# Patient Record
Sex: FEMALE | Race: WHITE | NOT HISPANIC OR LATINO | Employment: UNEMPLOYED | URBAN - METROPOLITAN AREA
[De-identification: names, ages, dates, MRNs, and addresses within clinical notes are randomized per-mention and may not be internally consistent; named-entity substitution may affect disease eponyms.]

---
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
## 2022-04-12 RX ORDER — SODIUM CHLORIDE 9 MG/ML
20 INJECTION, SOLUTION INTRAVENOUS ONCE
Status: COMPLETED | OUTPATIENT
Start: 2022-04-13 | End: 2022-04-13

## 2022-04-12 RX ORDER — ACETAMINOPHEN 325 MG/1
650 TABLET ORAL ONCE
Status: COMPLETED | OUTPATIENT
Start: 2022-04-13 | End: 2022-04-13

## 2022-04-12 RX ORDER — DIPHENHYDRAMINE HCL 25 MG
25 TABLET ORAL ONCE
Status: COMPLETED | OUTPATIENT
Start: 2022-04-13 | End: 2022-04-13

## 2022-04-13 ENCOUNTER — HOSPITAL ENCOUNTER (OUTPATIENT)
Dept: INFUSION CENTER | Facility: HOSPITAL | Age: 22
Discharge: HOME | End: 2022-04-13
Payer: COMMERCIAL

## 2022-04-13 VITALS
HEART RATE: 87 BPM | SYSTOLIC BLOOD PRESSURE: 121 MMHG | TEMPERATURE: 97.9 F | OXYGEN SATURATION: 99 % | DIASTOLIC BLOOD PRESSURE: 61 MMHG

## 2022-04-13 PROCEDURE — 96374 THER/PROPH/DIAG INJ IV PUSH: CPT

## 2022-04-13 RX ADMIN — SODIUM CHLORIDE 20 ML/HR: 0.9 INJECTION, SOLUTION INTRAVENOUS at 13:25

## 2022-04-13 RX ADMIN — USTEKINUMAB 390 MG: 130 SOLUTION INTRAVENOUS at 13:57

## 2022-04-13 RX ADMIN — DIPHENHYDRAMINE HCL 25 MG: 25 TABLET ORAL at 13:20

## 2022-04-13 RX ADMIN — ACETAMINOPHEN 650 MG: 325 TABLET ORAL at 13:19

## 2022-06-30 NOTE — PATIENT INSTRUCTIONS
Recommend patient go to the emergency room for further evaluation  Unable to do any imaging or labs here at urgent care to evaluate the pain  1. Functional oral stage for puree, regular solids and thin liquids marked by adequate oral acceptance, collection and transfer. 2. Functional pharyngeal phase for puree, regular solids and thin liquids marked by a present pharyngeal swallow trigger with hyolaryngeal elevation noted upon digital palpation without evidence of airway penetration/aspiration. Of note, patient coughed on initial trial of thin liquids, that did not reduplicate given further trials. Oropharyngeal swallow assessment unable to be completed, as patient unable to maintain alertness, and therefore PO trials contraindicated at this time.

## 2022-11-12 ENCOUNTER — OFFICE VISIT (OUTPATIENT)
Dept: FAMILY MEDICINE CLINIC | Facility: CLINIC | Age: 22
End: 2022-11-12

## 2022-11-12 VITALS
DIASTOLIC BLOOD PRESSURE: 80 MMHG | BODY MASS INDEX: 33.29 KG/M2 | OXYGEN SATURATION: 98 % | WEIGHT: 195 LBS | RESPIRATION RATE: 16 BRPM | HEART RATE: 82 BPM | TEMPERATURE: 98.3 F | SYSTOLIC BLOOD PRESSURE: 116 MMHG | HEIGHT: 64 IN

## 2022-11-12 DIAGNOSIS — K50.919 CROHN'S DISEASE WITH COMPLICATION, UNSPECIFIED GASTROINTESTINAL TRACT LOCATION (HCC): ICD-10-CM

## 2022-11-12 DIAGNOSIS — G43.109 MIGRAINE WITH AURA AND WITHOUT STATUS MIGRAINOSUS, NOT INTRACTABLE: ICD-10-CM

## 2022-11-12 DIAGNOSIS — Z13.220 ENCOUNTER FOR LIPID SCREENING FOR CARDIOVASCULAR DISEASE: ICD-10-CM

## 2022-11-12 DIAGNOSIS — Z13.6 ENCOUNTER FOR LIPID SCREENING FOR CARDIOVASCULAR DISEASE: ICD-10-CM

## 2022-11-12 DIAGNOSIS — H81.11 BENIGN PAROXYSMAL POSITIONAL VERTIGO OF RIGHT EAR: ICD-10-CM

## 2022-11-12 DIAGNOSIS — Z00.00 ANNUAL PHYSICAL EXAM: Primary | ICD-10-CM

## 2022-11-12 DIAGNOSIS — R53.83 FATIGUE, UNSPECIFIED TYPE: ICD-10-CM

## 2022-11-12 NOTE — PATIENT INSTRUCTIONS

## 2022-11-12 NOTE — ASSESSMENT & PLAN NOTE
- No focal deficits on neuro exam today   - Recommended blood work and focusing on staying hydrated   - Continued Excedrin PRN   - Offered daily preventative medication, she declines at this time

## 2022-11-12 NOTE — PROGRESS NOTES
Assessment/Plan:       Problem List Items Addressed This Visit        Cardiovascular and Mediastinum    Migraine     - No focal deficits on neuro exam today   - Recommended blood work and focusing on staying hydrated   - Continued Excedrin PRN   - Offered daily preventative medication, she declines at this time          Relevant Orders    CBC and differential    Comprehensive metabolic panel    TSH, 3rd generation with Free T4 reflex       Nervous and Auditory    Benign paroxysmal positional vertigo of right ear     - Positive Horacio-Hallpike right   - Recommended trial PT vestibular therapy          Relevant Orders    CBC and differential    Comprehensive metabolic panel    TSH, 3rd generation with Free T4 reflex    Ambulatory Referral to Physical Therapy       Other    Crohn's disease ([facility] [address] )    Fatigue     - Recommended sleep medicine consult/testing for sleep apnea          Relevant Orders    Ambulatory Referral to Sleep Medicine    TSH, 3rd generation with Free T4 reflex      Other Visit Diagnoses     Annual physical exam    -  Primary    Encounter for lipid screening for cardiovascular disease        Relevant Orders    Lipid Panel with Direct LDL reflex            Subjective:      Patient ID: Truong Chong is a 23 y o  female  HPI     Crohn's- She is planning on seeing new GI doctor, feels symptoms are uncontrolled, off medication right now  Has an appointment in December  Migraines- Typically occurring once weekly, takes Excedrin which helps  Maxalt was helping  Same type of headache, although now noticing a hot flash with it, sometimes dizziness  Feels like she's going to fall  Feels like she's spinning  Sometimes triggered by moving her head, movement  This lasts for a couple hours  Happens more so at work due to bright lights  When the headache is really bad, she feels like she is slowed, slurring although other people don't notice  Last migraine 1 week ago   History of heavy periods, but better with birth control  Also has noticed an eye twitch of her right eye for a couple months now  Fatigued as well, sleeping a lot and not getting restful sleep  Strong family history of sleep apnea  The following portions of the patient's history were reviewed and updated as appropriate: allergies, current medications, past family history, past medical history, past social history, past surgical history, and problem list     Review of Systems   All other systems reviewed and are negative  Objective:      /80   Pulse 82   Temp 98 3 °F (36 8 °C) (Tympanic)   Resp 16   Ht 5' 4" (1 626 m)   Wt 88 5 kg (195 lb)   SpO2 98%   BMI 33 47 kg/m²          Physical Exam  Vitals reviewed  Constitutional:       General: She is not in acute distress  Appearance: Normal appearance  She is not ill-appearing, toxic-appearing or diaphoretic  HENT:      Head: Normocephalic and atraumatic  Comments: Horacio-Hallpike positive right  Eyes:      General:         Right eye: No discharge  Left eye: No discharge  Extraocular Movements: Extraocular movements intact  Conjunctiva/sclera: Conjunctivae normal       Pupils: Pupils are equal, round, and reactive to light  Cardiovascular:      Rate and Rhythm: Normal rate and regular rhythm  Heart sounds: Normal heart sounds  No murmur heard  No friction rub  No gallop  Pulmonary:      Effort: Pulmonary effort is normal  No respiratory distress  Breath sounds: Normal breath sounds  No stridor  No wheezing or rhonchi  Musculoskeletal:         General: No swelling, tenderness or signs of injury  Right lower leg: No edema  Left lower leg: No edema  Skin:     General: Skin is warm  Coloration: Skin is not pale  Findings: No erythema or rash  Neurological:      General: No focal deficit present  Mental Status: She is alert and oriented to person, place, and time  Cranial Nerves: No cranial nerve deficit  Sensory: No sensory deficit  Motor: No weakness        Coordination: Coordination normal    Psychiatric:         Mood and Affect: Mood normal          Behavior: Behavior normal              Banner Leyden, DO [address] [place] [address] [facility]

## 2022-11-16 ENCOUNTER — PATIENT MESSAGE (OUTPATIENT)
Dept: FAMILY MEDICINE CLINIC | Facility: CLINIC | Age: 22
End: 2022-11-16

## 2022-11-16 DIAGNOSIS — E03.8 SUBCLINICAL HYPOTHYROIDISM: ICD-10-CM

## 2022-11-16 DIAGNOSIS — E78.1 HYPERTRIGLYCERIDEMIA: ICD-10-CM

## 2022-11-16 DIAGNOSIS — R73.01 ELEVATED FASTING GLUCOSE: Primary | ICD-10-CM

## 2023-01-09 ENCOUNTER — OFFICE VISIT (OUTPATIENT)
Dept: FAMILY MEDICINE CLINIC | Facility: CLINIC | Age: 23
End: 2023-01-09

## 2023-01-09 VITALS
RESPIRATION RATE: 18 BRPM | DIASTOLIC BLOOD PRESSURE: 66 MMHG | HEART RATE: 86 BPM | HEIGHT: 64 IN | OXYGEN SATURATION: 98 % | TEMPERATURE: 97.9 F | WEIGHT: 200.6 LBS | SYSTOLIC BLOOD PRESSURE: 106 MMHG | BODY MASS INDEX: 34.25 KG/M2

## 2023-01-09 DIAGNOSIS — T78.40XS ALLERGIC REACTION, SEQUELA: ICD-10-CM

## 2023-01-09 DIAGNOSIS — R09.81 NASAL CONGESTION: ICD-10-CM

## 2023-01-09 DIAGNOSIS — E78.1 HYPERTRIGLYCERIDEMIA: ICD-10-CM

## 2023-01-09 DIAGNOSIS — K50.919 CROHN'S DISEASE WITH COMPLICATION, UNSPECIFIED GASTROINTESTINAL TRACT LOCATION (HCC): ICD-10-CM

## 2023-01-09 DIAGNOSIS — L50.9 URTICARIA: Primary | ICD-10-CM

## 2023-01-09 RX ORDER — FLUTICASONE PROPIONATE 50 MCG
1 SPRAY, SUSPENSION (ML) NASAL DAILY
Qty: 16 G | Refills: 5 | Status: SHIPPED | OUTPATIENT
Start: 2023-01-09

## 2023-01-09 NOTE — PROGRESS NOTES
Assessment/Plan:       Problem List Items Addressed This Visit        Musculoskeletal and Integument    Urticaria - Primary    Relevant Orders    Ambulatory Referral to Allergy       Other    Crohn's disease ([facility] [address] )    Hypertriglyceridemia    Relevant Orders    Lipid Panel with Direct LDL reflex   Other Visit Diagnoses     Allergic reaction, sequela        Relevant Orders    Ambulatory Referral to Allergy    Nasal congestion        Relevant Medications    fluticasone (FLONASE) 50 mcg/act nasal spray            Referral provided for allergy  Recommended to start Flonase daily  Diet modification discussed and hand outs provided for hypertriglyceridemia  Will recheck in 3 months  Subjective:      Patient ID: Salome Martínez is a 23 y o  female  HPI     Over the holidays, was sick with Flu, took amoxicillin and got rash around eyes, on face, burning, 3 days of this, then went and got steroid and that calmed it down  Usually fine with amoxicillin in past  Allergic to perfume  No food allergies  Seasonal allergies bad  No eczema, yes asthma history  Allergy symptoms resolved, cold symptoms improved, slight mucous/cough  This occurred mid-December  No further vertigo, resolved with more water and going to gym  Recent blood work reviewed, persistent hypertriglyceridemia, triglycerides 413  The following portions of the patient's history were reviewed and updated as appropriate: allergies, current medications, past family history, past medical history, past social history, past surgical history, and problem list     Review of Systems   All other systems reviewed and are negative  Objective:      /66   Pulse 86   Temp 97 9 °F (36 6 °C) (Tympanic)   Resp 18   Ht 5' 4" (1 626 m)   Wt 91 kg (200 lb 9 6 oz)   SpO2 98%   BMI 34 43 kg/m²          Physical Exam  Vitals reviewed  Constitutional:       General: She is not in acute distress  Appearance: Normal appearance   She is not ill-appearing, toxic-appearing or diaphoretic  HENT:      Head: Normocephalic and atraumatic  Right Ear: Ear canal and external ear normal  A middle ear effusion is present  There is no impacted cerumen  Left Ear: Ear canal and external ear normal  A middle ear effusion is present  There is no impacted cerumen  Nose: Congestion present  Eyes:      General:         Right eye: No discharge  Left eye: No discharge  Extraocular Movements: Extraocular movements intact  Conjunctiva/sclera: Conjunctivae normal    Cardiovascular:      Rate and Rhythm: Normal rate and regular rhythm  Heart sounds: Normal heart sounds  No murmur heard  No friction rub  No gallop  Pulmonary:      Effort: Pulmonary effort is normal  No respiratory distress  Breath sounds: Normal breath sounds  No stridor  No wheezing or rhonchi  Musculoskeletal:         General: No swelling, tenderness or signs of injury  Right lower leg: No edema  Left lower leg: No edema  Skin:     General: Skin is warm  Coloration: Skin is not pale  Findings: No erythema or rash  Neurological:      Mental Status: She is alert and oriented to person, place, and time  Motor: No weakness     Psychiatric:         Mood and Affect: Mood normal          Behavior: Behavior normal              Bertram Mcardle, DO [address] [place] [address]

## 2023-01-09 NOTE — PATIENT INSTRUCTIONS
Nutrition: How to Make Michael Antonio6  A healthy diet has a lot of benefits  It can prevent certain health conditions like heart disease and cancer, and it can lower your cholesterol  It can give you more energy, help you focus, and improve your mood  It can also help you lose weight or stay at a healthy weight  Path to Improved Health  The choices you make about what you eat and drink matter  They should add up to a balanced, nutritious diet  We all have different calorie needs based on our age, sex, and activity level  Health conditions can have a role, too  Fruits and Vegetables  Fruits and vegetables are rich in fiber, vitamins, and minerals  They should be the basis of your diet  Try to get many different colors of fruits and vegetables each day to add flavor and variety  Fruits and vegetables should cover half of your plate at each meal  Try not to add saturated fats and sugar to vegetables and fruits  This means avoiding margarine, butter, mayonnaise, and sour cream  You can use yogurt, healthy oils (such as canola or olive oil), or herbs instead  Potatoes and corn are not considered vegetables  Your body processes them more like grains  FRUITS & VEGETABLES   INSTEAD OF THIS: TRY THIS:   Regular or fried vegetables served with cream, cheese, or butter Raw, steamed, boiled, sautéed, or baked vegetables tossed with olive oil, salt, and pepper, or with onions or spices added (like garlic and cumin)   Fruits served with cream cheese or sugary sauces Fresh fruit with peanut, almond, or cashew butter or plain yogurt   Fried potatoes, including french fries, hash browns, and potato chips Baked sweet potatoes or other vegetables     Grains  Choose products that list whole grains as the first ingredient  Whole grains are high in fiber, protein, and vitamins  They are digested slowly, which helps you feel full longer and keeps you from overeating   Avoid products that say “enriched ”  Hot cereals like oatmeal are usually low in saturated fat  However, instant cereals with cream may contain processed oils and can be high in sugar  Granola cereals usually contain a lot of sugar  Cold cereals are generally made with refined grains and are high in sugars  Look for whole-grain, low-sugar options instead  Try not to eat rich sweets, such as doughnuts, rolls, and muffins  Consider fruit or a piece of dark chocolate instead to satisfy your sweet tooth  GRAINS   INSTEAD OF THIS: TRY THIS:   Croissants, rolls, biscuits, and white breads Whole-grain breads, including wheat, rye, and pumpernickel   Doughnuts, pastries, and scones Whole-grain English muffins and small whole-grain bagels   Fried tortillas Soft tortillas (corn or whole wheat) without trans fats   Sugary cereals and regular granola Whole-grain cereal, oatmeal, and reduced-sugar granola   Snack crackers Whole-grain crackers   Potato or corn chips and buttered popcorn Unbuttered popcorn   White pasta Whole-wheat pasta   White rice Brown or wild rice   Fried rice or pasta mixes Brown rice or whole-grain pasta with low-sodium vegetable sauce   All-purpose white flour Whole-wheat flour     Protein  Protein can come from animal and vegetable sources  People who get more of their protein from animal sources tend to have more health problems that can lead to illness and early death  It is healthier to eat meat less often and get most of your protein from plant sources  When you eat meat, choose leaner cuts  Vegetable Protein Sources  There are many ways to get protein in your diet even if you do not eat meat  Most vegetables have some protein  When you eat these vegetables with whole grains, seeds, nuts, and especially beans, you can get a good amount of protein  You can swap beans for meat in recipes like lasagna or chili  Soy foods such as tofu, tempeh, and edamame are also good sources of protein      Beef, Pork, Veal, and [employer] and veal cuts have the words “loin” or “round” in their names  Lean pork cuts have the words “loin” or “leg” in their names  Trim off the outside fat before cooking the meat  Trim any inside fat before eating it  Use herbs, spices, and low-sodium marinades to season meat  Baking, broiling, grilling, and roasting are the healthiest ways to cook meats  Lean cuts can be panbroiled or stir-fried  Use a nonstick pan, canola oil, or olive oil instead of butter or margarine  Don't serve meat with high-fat sauces and gravies  Poultry  Chicken breasts are a good choice because they are low in fat and high in protein  Only eat duck and goose once in a while, because they are higher in saturated fat  Remove skin and visible fat before cooking  Baking, broiling, grilling, and roasting are the healthiest ways to [employer]  Skinless poultry can be pan broiled or stir fried  Use a nonstick pan, canola oil, or olive oil instead of butter or margarine  Seafood  Most seafood is high in healthy polyunsaturated fats  Healthy omega-3 fatty acids also are found in some fish, such as salmon and cold-water trout  If good-quality fresh fish isn't available, buy frozen fish  To prepare fish, you should poach, steam, bake, broil, or grill it      PROTEIN   INSTEAD OF THIS: TRY THIS:   Prime and marbled cuts of meat Select-grade lean beef, such as round, sirloin, and loin cuts   Pork spare ribs and liu Lean pork, such as tenderloin and loin chop, turkey ilu, tofu liu   Regular ground beef Lean or extra-lean ground beef, ground chicken or turkey, tempeh, or beans   Lunch meats, such as pepperoni, salami, bologna, and liverwurst Lean lunch meats, such as turkey, chicken, and ham   Regular hot dogs and sausage Fat-free hot dogs, turkey dogs, tofu hot dogs   Breaded fish sticks and cakes, fish canned in oil, or seafood prepared with butter or served with high-fat sauce Fish (fresh, frozen, or canned in water), grilled fish sticks and cakes, or shellfish     Dairy  Choose low-fat, skim, or nondairy milk, such as soy, rice, or almond milk  Try low-fat or part-skim cheeses and other dairy products, or choose smaller portions of foods high in saturated fat  Yogurt can replace sour cream in many recipes  It is important to pick yogurt without added sugar  Try mixing yogurt with fruit for dessert  Sorbet and frozen yogurt are lower in fat than ice cream     DAIRY   INSTEAD OF THIS: TRY THIS:   Whole milk Skim (nonfat), 1% or 2% (low fat), or nondairy milk, such as soy, rice, almond, or cashew milk   Cream or evaporated milk Evaporated skim milk   Regular buttermilk Low-fat buttermilk   Yogurt made with whole milk Low-fat or nonfat yogurt   Regular cheese, including American, blue, Brie, cheddar, Ravinder, and Parmesan Low-fat cheese with less than 3 g fat per serving, or nondairy soy cheese   Regular cottage cheese Low-fat cottage cheese (less than 2% fat)   Regular cream cheese Low-fat cream cheese with less than 3 g fat per 1 oz, or skim ricotta   Ice cream Sorbet, sherbet, or frozen yogurt with less than 3 g fat per ½-cup serving     Fats and Oils  Although high-fat foods are higher in calories, they can help you feel satisfied with eating less  Don't be afraid to have fats in your diet, but try to limit saturated and trans fats  You need saturated and unsaturated fats in your diet, but most Americans get too much saturated fat  Heart disease, diabetes, some cancers, and arthritis have been linked to diets high in saturated fat, particularly saturated fats from animal products  FATS & OILS   INSTEAD OF THIS: TRY THIS:   Cookies Fruit or whole-grain cookies   Shortening, butter, and margarine Olive, canola, and soybean oils   Regular mayonnaise Yogurt   Regular salad dressing Vinaigrette with olive oil and vinegar   Butter or fat to grease pans Nonstick cooking spray, olive oil, or canola oil     Beverages  It is important that you stay hydrated   However, drinks that contain sugar are not healthy  This includes fruit juices, soda, sports and energy drinks, sweetened or flavored milk, and sweet tea  Artificial sweeteners may also be bad for your health  Drink mostly water or other unsweetened drinks  Don't drink too much alcohol  Women should have no more than one drink per day  Men should have no more than two drinks per day  More information  American Academy of [employer]icians Patient Information Resource   https://familydoctor  org/dietary-fats-whats-good-and-whats-bad/ and https://familydoctor  org/nutrition-tips-for-improving-your-health/  [employer] Eating Plate   [facility]  Department of [place], Choose My Plate   [employer] br    Cholesterol and Your Health   AMBULATORY CARE:   Cholesterol  is a waxy, fat-like substance  Your body uses cholesterol to make hormones and new cells, and to protect nerves  Cholesterol is made by your body  It also comes from certain foods you eat, such as meat and dairy products  Your healthcare provider can help you set goals for your cholesterol levels  He or she can help you create a plan to meet your goals  Cholesterol level goals: Your cholesterol level goals depend on your risk for heart disease, your age, and your other health conditions  The following are general guidelines: Total cholesterol  includes low-density lipoprotein (LDL), high-density lipoprotein (HDL), and triglyceride levels  The total cholesterol level should be lower than 200 mg/dL and is best at about 150 mg/dL  LDL cholesterol  is called bad cholesterol  because it forms plaque in your arteries  As plaque builds up, your arteries become narrow, and less blood flows through  When plaque decreases blood flow to your heart, you may have chest pain  If plaque completely blocks an artery that brings blood to your heart, you may have a heart attack  Plaque can break off and form blood clots  Blood clots may block arteries in your brain and cause a stroke  The level should be less than 130 mg/dL and is best at about 100 mg/dL  HDL cholesterol  is called good cholesterol  because it helps remove LDL cholesterol from your arteries  It does this by attaching to LDL cholesterol and carrying it to your liver  Your liver breaks down LDL cholesterol so your body can get rid of it  High levels of HDL cholesterol can help prevent a heart attack and stroke  Low levels of HDL cholesterol can increase your risk for heart disease, heart attack, and stroke  The level should be 60 mg/dL or higher  Triglycerides  are a type of fat that store energy from foods you eat  High levels of triglycerides also cause plaque buildup  This can increase your risk for a heart attack or stroke  If your triglyceride level is high, your LDL cholesterol level may also be high  The level should be less than 150 mg/dL  Any of the following can increase your risk for high cholesterol:   Smoking cigarettes    Being overweight or obese, or not getting enough exercise    Drinking large amounts of alcohol    A medical condition such as hypertension (high blood pressure) or diabetes    Certain genes passed from your parents to you    Age older than 65 years    What you need to know about having your cholesterol levels checked: Adults 21to 39years of age should have their cholesterol levels checked every 4 to 6 years  Adults 45 years or older should have their cholesterol checked every 1 to 2 years  You may need your cholesterol checked more often, or at a younger age, if you have risk factors for heart disease  You may also need to have your cholesterol checked more often if you have other health conditions, such as diabetes  Blood tests are used to check cholesterol levels  Blood tests measure your levels of triglycerides, LDL cholesterol, and HDL cholesterol    How healthy fats affect your cholesterol levels:  Healthy fats, also called unsaturated fats, help lower LDL cholesterol and triglyceride levels  Healthy fats include the following:  Monounsaturated fats  are found in foods such as olive oil, canola oil, avocado, nuts, and olives  Polyunsaturated fats,  such as omega 3 fats, are found in fish, such as salmon, trout, and tuna  They can also be found in plant foods such as flaxseed, walnuts, and soybeans  How unhealthy fats affect your cholesterol levels:  Unhealthy fats increase LDL cholesterol and triglyceride levels  They are found in foods high in cholesterol, saturated fat, and trans fat:  Cholesterol  is found in eggs, dairy, and meat  Saturated fat  is found in butter, cheese, ice cream, whole milk, and coconut oil  Saturated fat is also found in meat, such as sausage, hot dogs, and bologna  Trans fat  is found in liquid oils and is used in fried and baked foods  Foods that contain trans fats include chips, crackers, muffins, sweet rolls, microwave popcorn, and cookies  Treatment  for high cholesterol will also decrease your risk of heart disease, heart attack, and stroke  Treatment may include any of the following:  Lifestyle changes  may include food, exercise, weight loss, and quitting smoking  You may also need to decrease the amount of alcohol you drink  Your healthcare provider will want you to start with lifestyle changes  Other treatment may be added if lifestyle changes are not enough  Your healthcare provider may recommend you work with a team to manage hyperlipidemia  The team may include medical experts such as a dietitian, an exercise or physical therapist, and a behavior therapist  Your family members may be included in helping you create lifestyle changes  Medicines  may be given to lower your LDL cholesterol, triglyceride levels, or total cholesterol level   You may need medicines to lower your cholesterol if any of the following is true:    You have a history of stroke, TIA, unstable angina, or a heart attack  Your LDL cholesterol level is 190 mg/dL or higher  You are age 36 to 76 years, have diabetes or heart disease risk factors, and your LDL cholesterol is 70 mg/dL or higher  Supplements  include fish oil, red yeast rice, and garlic  Fish oil may help lower your triglyceride and LDL cholesterol levels  It may also increase your HDL cholesterol level  Red yeast rice may help decrease your total cholesterol level and LDL cholesterol level  Garlic may help lower your total cholesterol level  Do not take any supplements without talking to your healthcare provider  Food changes you can make to lower your cholesterol levels:  A dietitian can help you create a healthy eating plan  He or she can show you how to read food labels and choose foods low in saturated fat, trans fats, and cholesterol  Decrease the total amount of fat you eat  Choose lean meats, fat-free or 1% fat milk, and low-fat dairy products, such as yogurt and cheese  Try to limit or avoid red meats  Limit or do not eat fried foods or baked goods, such as cookies  Replace unhealthy fats with healthy fats  Cook foods in olive oil or canola oil  Choose soft margarines that are low in saturated fat and trans fat  Seeds, nuts, and avocados are other examples of healthy fats  Eat foods with omega-3 fats  Examples include salmon, tuna, mackerel, walnuts, and flaxseed  Eat fish 2 times per week  Pregnant women should not eat fish that have high levels of mercury, such as shark, swordfish, and casandra mackerel  Increase the amount of high-fiber foods you eat  High-fiber foods can help lower your LDL cholesterol  Aim to get between 20 and 30 grams of fiber each day  Fruits and vegetables are high in fiber  Eat at least 5 servings each day  Other high-fiber foods are whole-grain or whole-wheat breads, pastas, or cereals, and brown rice  Eat 3 ounces of whole-grain foods each day  Increase fiber slowly  You may have abdominal discomfort, bloating, and gas if you add fiber to your diet too quickly  Eat healthy protein foods  Examples include low-fat dairy products, skinless chicken and turkey, fish, and nuts  Limit foods and drinks that are high in sugar  Your dietitian or healthcare provider can help you create daily limits for high-sugar foods and drinks  The limit may be lower if you have diabetes or another health condition  Limits can also help you lose weight if needed  Lifestyle changes you can make to lower your cholesterol levels:   Maintain a healthy weight  Ask your healthcare provider what a healthy weight is for you  Ask him or her to help you create a weight loss plan if needed  Weight loss can decrease your total cholesterol and triglyceride levels  Weight loss may also help keep your blood pressure at a healthy level  Be physically active throughout the day  Physical activity, such as exercise, can help lower your total cholesterol level and maintain a healthy weight  Physical activity can also help increase your HDL cholesterol level  Work with your healthcare provider to create an program that is right for you  Get at least 30 to 40 minutes of moderate physical activity most days of the week  Examples of exercise include brisk walking, swimming, or biking  Also include strength training at least 2 times each week  Your healthcare providers can help you create a physical activity plan  Do not smoke  Nicotine and other chemicals in cigarettes and cigars can raise your cholesterol levels  Ask your healthcare provider for information if you currently smoke and need help to quit  E-cigarettes or smokeless tobacco still contain nicotine  Talk to your healthcare provider before you use these products  Limit or do not drink alcohol  Alcohol can increase your triglyceride levels  Ask your healthcare provider before you drink alcohol   Ask how much is okay for you to drink in 24 hours or 1 week  Follow up with your doctor as directed:  Write down your questions so you remember to ask them during your visits  © Copyright [employer] 2022 Information is for End User's use only and may not be sold, redistributed or otherwise used for commercial purposes  All illustrations and images included in CareNotes® are the copyrighted property of A D A M , Inc  or [facility] Omer Marr   The above information is an  only  It is not intended as medical advice for individual conditions or treatments  Talk to your doctor, nurse or pharmacist before following any medical regimen to see if it is safe and effective for you